# Patient Record
Sex: FEMALE | Race: BLACK OR AFRICAN AMERICAN | NOT HISPANIC OR LATINO | Employment: FULL TIME | URBAN - METROPOLITAN AREA
[De-identification: names, ages, dates, MRNs, and addresses within clinical notes are randomized per-mention and may not be internally consistent; named-entity substitution may affect disease eponyms.]

---

## 2022-09-01 ENCOUNTER — SOCIAL WORK (OUTPATIENT)
Dept: ADMINISTRATIVE | Facility: OTHER | Age: 22
End: 2022-09-01

## 2022-09-01 NOTE — PROGRESS NOTES
SW met with pt regarding initial OB assessment. Pt stated this is her 1st pregnancy/0-miscarriage. Pt stated lives with her aunt and able to perform ADL's independently. Pt stated does work. Pt stated support system is her mother/Cadence/aunt/Sarah. Pt stated has medicaid([employer]). Pt stated does not have WIC. Pt stated is going to breastfeed. SW provide pt with information on other community resources and referred to the [facility].SW faxed and scanned pt's notification of pregnancy into epic.  No other needs identified at this time.    Jazmine Mccarty,MSW  Pager#[phone]

## 2022-09-26 PROBLEM — Z34.01 ENCOUNTER FOR SUPERVISION OF NORMAL FIRST PREGNANCY IN FIRST TRIMESTER: Status: ACTIVE | Noted: 2022-09-26

## 2022-10-24 PROBLEM — O98.812 CHLAMYDIA INFECTION AFFECTING PREGNANCY IN SECOND TRIMESTER: Status: ACTIVE | Noted: 2022-10-24

## 2022-10-24 PROBLEM — O35.HXX0 SHORT FEMUR OF FETUS ON PRENATAL ULTRASOUND: Status: ACTIVE | Noted: 2022-10-24

## 2022-10-24 PROBLEM — A59.01 TRICHOMONAL VAGINITIS DURING PREGNANCY IN SECOND TRIMESTER: Status: ACTIVE | Noted: 2022-10-24

## 2022-10-24 PROBLEM — A74.9 CHLAMYDIA INFECTION AFFECTING PREGNANCY IN SECOND TRIMESTER: Status: ACTIVE | Noted: 2022-10-24

## 2022-10-24 PROBLEM — O09.892 SUPERVISION OF OTHER HIGH RISK PREGNANCIES, SECOND TRIMESTER: Status: ACTIVE | Noted: 2022-10-24

## 2022-10-24 PROBLEM — O23.592 TRICHOMONAL VAGINITIS DURING PREGNANCY IN SECOND TRIMESTER: Status: ACTIVE | Noted: 2022-10-24

## 2022-11-28 PROBLEM — O35.8XX0 PREGNANCY COMPLICATED BY FETAL SKELETAL DYSPLASIA: Status: ACTIVE | Noted: 2022-11-28

## 2023-01-23 ENCOUNTER — SOCIAL WORK (OUTPATIENT)
Dept: ADMINISTRATIVE | Facility: OTHER | Age: 23
End: 2023-01-23

## 2023-01-23 NOTE — PROGRESS NOTES
SW received pt's FMLA paperwork via epic. SW completed the demographic sheet/attached clinical note on pt's FMLA paperwork and gave the paperwork to MD for review/signature. SW later received pt's completed FMLA Paperwork from MD and fax to([phone])[facility] of [facility]. Pt aware that paperwork has been completed and faxed. SW scanned paperwork into epic. No other needs identified at this time.    Jazmine Mccarty,MSW  Pager#[phone]

## 2023-01-27 ENCOUNTER — SOCIAL WORK (OUTPATIENT)
Dept: ADMINISTRATIVE | Facility: OTHER | Age: 23
End: 2023-01-27

## 2023-01-27 NOTE — PROGRESS NOTES
SW received message from pt regarding FMLA paperwork. SW made phone call to pt([phone])regarding FMLA paperwork;per pt need the FMLA paperwork e mail her. SW e mail pt the FMLA paperwork. SW made phone call to pt([phone])informed her that the [place] paperwork has been e mail. No other needs identified at this time.    Jazmine Mccarty,MSW  Pager#[phone]

## 2023-04-03 PROBLEM — O36.5990 FETAL GROWTH RESTRICTION ANTEPARTUM: Status: ACTIVE | Noted: 2023-04-03

## 2023-04-03 PROBLEM — O26.899 PELVIC PRESSURE IN PREGNANCY: Status: ACTIVE | Noted: 2023-04-03

## 2023-04-03 PROBLEM — R10.2 PELVIC PRESSURE IN PREGNANCY: Status: ACTIVE | Noted: 2023-04-03

## 2023-04-05 PROBLEM — O99.820 GROUP B STREPTOCOCCAL CARRIAGE COMPLICATING PREGNANCY: Status: ACTIVE | Noted: 2023-04-05

## 2023-04-06 PROBLEM — O09.33 LIMITED PRENATAL CARE IN THIRD TRIMESTER: Status: ACTIVE | Noted: 2023-04-06

## 2023-04-06 PROBLEM — O42.90 PROM (PREMATURE RUPTURE OF MEMBRANES): Status: ACTIVE | Noted: 2023-04-06

## 2023-04-07 PROBLEM — O26.899 PELVIC PRESSURE IN PREGNANCY: Status: RESOLVED | Noted: 2023-04-03 | Resolved: 2023-04-07

## 2023-04-07 PROBLEM — O09.33 LIMITED PRENATAL CARE IN THIRD TRIMESTER: Status: RESOLVED | Noted: 2023-04-06 | Resolved: 2023-04-07

## 2023-04-07 PROBLEM — O42.90 PROM (PREMATURE RUPTURE OF MEMBRANES): Status: RESOLVED | Noted: 2023-04-06 | Resolved: 2023-04-07

## 2023-04-07 PROBLEM — O35.8XX0 PREGNANCY COMPLICATED BY FETAL SKELETAL DYSPLASIA: Status: RESOLVED | Noted: 2022-11-28 | Resolved: 2023-04-07

## 2023-04-07 PROBLEM — Z34.01 ENCOUNTER FOR SUPERVISION OF NORMAL FIRST PREGNANCY IN FIRST TRIMESTER: Status: RESOLVED | Noted: 2022-09-26 | Resolved: 2023-04-07

## 2023-04-07 PROBLEM — O23.592 TRICHOMONAL VAGINITIS DURING PREGNANCY IN SECOND TRIMESTER: Status: RESOLVED | Noted: 2022-10-24 | Resolved: 2023-04-07

## 2023-04-07 PROBLEM — A74.9 CHLAMYDIA INFECTION AFFECTING PREGNANCY IN SECOND TRIMESTER: Status: RESOLVED | Noted: 2022-10-24 | Resolved: 2023-04-07

## 2023-04-07 PROBLEM — R10.2 PELVIC PRESSURE IN PREGNANCY: Status: RESOLVED | Noted: 2023-04-03 | Resolved: 2023-04-07

## 2023-04-07 PROBLEM — O09.892 SUPERVISION OF OTHER HIGH RISK PREGNANCIES, SECOND TRIMESTER: Status: RESOLVED | Noted: 2022-10-24 | Resolved: 2023-04-07

## 2023-04-07 PROBLEM — O35.HXX0 SHORT FEMUR OF FETUS ON PRENATAL ULTRASOUND: Status: RESOLVED | Noted: 2022-10-24 | Resolved: 2023-04-07

## 2023-04-07 PROBLEM — O99.820 GROUP B STREPTOCOCCAL CARRIAGE COMPLICATING PREGNANCY: Status: RESOLVED | Noted: 2023-04-05 | Resolved: 2023-04-07

## 2023-04-07 PROBLEM — O36.5990 FETAL GROWTH RESTRICTION ANTEPARTUM: Status: RESOLVED | Noted: 2023-04-03 | Resolved: 2023-04-07

## 2023-04-07 PROBLEM — O98.812 CHLAMYDIA INFECTION AFFECTING PREGNANCY IN SECOND TRIMESTER: Status: RESOLVED | Noted: 2022-10-24 | Resolved: 2023-04-07

## 2023-04-07 PROBLEM — A59.01 TRICHOMONAL VAGINITIS DURING PREGNANCY IN SECOND TRIMESTER: Status: RESOLVED | Noted: 2022-10-24 | Resolved: 2023-04-07

## 2023-06-13 ENCOUNTER — PATIENT MESSAGE (OUTPATIENT)
Dept: RESEARCH | Facility: HOSPITAL | Age: 23
End: 2023-06-13

## 2023-06-20 ENCOUNTER — PATIENT MESSAGE (OUTPATIENT)
Dept: RESEARCH | Facility: HOSPITAL | Age: 23
End: 2023-06-20

## 2023-06-27 ENCOUNTER — PATIENT MESSAGE (OUTPATIENT)
Dept: RESEARCH | Facility: HOSPITAL | Age: 23
End: 2023-06-27

## 2023-07-05 ENCOUNTER — PATIENT MESSAGE (OUTPATIENT)
Dept: RESEARCH | Facility: HOSPITAL | Age: 23
End: 2023-07-05